# Patient Record
Sex: FEMALE | Race: ASIAN | NOT HISPANIC OR LATINO
[De-identification: names, ages, dates, MRNs, and addresses within clinical notes are randomized per-mention and may not be internally consistent; named-entity substitution may affect disease eponyms.]

---

## 2018-07-13 ENCOUNTER — TRANSCRIPTION ENCOUNTER (OUTPATIENT)
Age: 43
End: 2018-07-13

## 2018-10-24 ENCOUNTER — TRANSCRIPTION ENCOUNTER (OUTPATIENT)
Age: 43
End: 2018-10-24

## 2021-08-09 ENCOUNTER — TRANSCRIPTION ENCOUNTER (OUTPATIENT)
Age: 46
End: 2021-08-09

## 2021-11-19 ENCOUNTER — TRANSCRIPTION ENCOUNTER (OUTPATIENT)
Age: 46
End: 2021-11-19

## 2021-12-01 ENCOUNTER — TRANSCRIPTION ENCOUNTER (OUTPATIENT)
Age: 46
End: 2021-12-01

## 2022-01-14 ENCOUNTER — TRANSCRIPTION ENCOUNTER (OUTPATIENT)
Age: 47
End: 2022-01-14

## 2022-03-16 PROBLEM — Z00.00 ENCOUNTER FOR PREVENTIVE HEALTH EXAMINATION: Status: ACTIVE | Noted: 2022-03-16

## 2022-07-07 ENCOUNTER — APPOINTMENT (OUTPATIENT)
Dept: ORTHOPEDIC SURGERY | Facility: CLINIC | Age: 47
End: 2022-07-07

## 2022-07-07 DIAGNOSIS — F17.200 NICOTINE DEPENDENCE, UNSPECIFIED, UNCOMPLICATED: ICD-10-CM

## 2022-07-07 DIAGNOSIS — M25.571 PAIN IN RIGHT ANKLE AND JOINTS OF RIGHT FOOT: ICD-10-CM

## 2022-07-07 DIAGNOSIS — Z63.5 DISRUPTION OF FAMILY BY SEPARATION AND DIVORCE: ICD-10-CM

## 2022-07-07 DIAGNOSIS — Z78.9 OTHER SPECIFIED HEALTH STATUS: ICD-10-CM

## 2022-07-07 DIAGNOSIS — S93.491A SPRAIN OF OTHER LIGAMENT OF RIGHT ANKLE, INITIAL ENCOUNTER: ICD-10-CM

## 2022-07-07 PROCEDURE — 73610 X-RAY EXAM OF ANKLE: CPT | Mod: RT

## 2022-07-07 PROCEDURE — E0114: CPT

## 2022-07-07 PROCEDURE — L1902: CPT | Mod: RT

## 2022-07-07 PROCEDURE — 99203 OFFICE O/P NEW LOW 30 MIN: CPT

## 2022-07-07 RX ORDER — IBUPROFEN 600 MG/1
600 TABLET ORAL
Refills: 0 | Status: ACTIVE | COMMUNITY
Start: 2022-07-07

## 2022-07-07 SDOH — SOCIAL STABILITY - SOCIAL INSECURITY: DISRUPTION OF FAMILY BY SEPARATION AND DIVORCE: Z63.5

## 2022-07-07 NOTE — HISTORY OF PRESENT ILLNESS
[de-identified] : 47 year old female presents with 5-6 week history of right ankle pain. She slipped on her stair case and twisted her ankle on the 1st of June 2022. She continues to have intermittent swelling, with radicular pain now into the foot and lower leg.

## 2022-07-07 NOTE — PHYSICAL EXAM
[Normal Coordination] : normal coordination [Normal DTR UE/LE] : normal DTR UE/LE  [Normal Sensation] : normal sensation [Normal Mood and Affect] : normal mood and affect [Orientated] : orientated [Normal Skin] : normal skin [No Rash] : no rash [No Ulcers] : no ulcers [No Lesions] : no lesions [No obvious lymphadenopathy in areas examined] : no obvious lymphadenopathy in areas examined [Right] : right foot and ankle [5___] : plantar flexion 5[unfilled]/5 [] : varicosities are not present [de-identified] : plantar flexion 20 degrees [[id number]] : dorsiflexion -5 degrees

## 2022-07-07 NOTE — ASSESSMENT
[FreeTextEntry1] : Discussed with patient that the average ankle sprain typically heals in 4-6 weeks however due to persistent symptoms as well as stiffness/ decreased range of motion noted in the ankle that we will be sending her to physical therapy.  She was placed in an ASO brace for support instability.  She will follow up for repeat evaluation and 8-10 weeks.  At that time she continues to have significant disability with no improvement of physical therapy might consider a MRI of the ankle.  She expressed understanding and had no additional questions or concerns

## 2022-09-02 ENCOUNTER — APPOINTMENT (OUTPATIENT)
Dept: ORTHOPEDIC SURGERY | Facility: CLINIC | Age: 47
End: 2022-09-02

## 2022-12-14 ENCOUNTER — APPOINTMENT (OUTPATIENT)
Dept: GASTROENTEROLOGY | Facility: CLINIC | Age: 47
End: 2022-12-14

## 2023-04-05 ENCOUNTER — APPOINTMENT (OUTPATIENT)
Dept: GASTROENTEROLOGY | Facility: CLINIC | Age: 48
End: 2023-04-05
Payer: MEDICAID

## 2023-04-05 VITALS
OXYGEN SATURATION: 98 % | HEIGHT: 62 IN | SYSTOLIC BLOOD PRESSURE: 98 MMHG | DIASTOLIC BLOOD PRESSURE: 81 MMHG | BODY MASS INDEX: 20.06 KG/M2 | HEART RATE: 92 BPM | WEIGHT: 109 LBS

## 2023-04-05 DIAGNOSIS — Z78.9 OTHER SPECIFIED HEALTH STATUS: ICD-10-CM

## 2023-04-05 DIAGNOSIS — R10.9 UNSPECIFIED ABDOMINAL PAIN: ICD-10-CM

## 2023-04-05 DIAGNOSIS — F17.200 NICOTINE DEPENDENCE, UNSPECIFIED, UNCOMPLICATED: ICD-10-CM

## 2023-04-05 PROCEDURE — 99204 OFFICE O/P NEW MOD 45 MIN: CPT

## 2023-04-05 RX ORDER — ATORVASTATIN CALCIUM 10 MG/1
10 TABLET, FILM COATED ORAL
Refills: 0 | Status: ACTIVE | COMMUNITY

## 2023-04-05 NOTE — HISTORY OF PRESENT ILLNESS
[FreeTextEntry1] : Patient is a 46 y/o female with PMHx of HLD whom presents for CRC. Patient notes more so constipated at times. Never had CRC. Patient also notes quite frequent GERD.

## 2023-04-05 NOTE — PHYSICAL EXAM

## 2023-04-05 NOTE — ASSESSMENT
[FreeTextEntry1] : Patient is a 48 y/o female with PMHx of HLD whom presents for CRC. Patient notes more so constipated at times. Never had CRC. Patient also notes quite frequent GERD. \par \par GERD\par - Dietary modification , low caffeine, low acidic foods\par - Avoid Meals 3-4 hours before bed\par - Head of Bed over 30 degrees\par - Pantoprazole 40mg daily\par - Setup for EGD \par \par Constipation\par - Risk and benefit explained in detail to the patient\par - Clear liquid diet the day before the procedure\par - Finish all of the prescribed prep as ordered to insure good visualization\par - Please refrain from any NSAID use including ASA one week before\par - Follow up 3-4 weeks after the procedure stressed to insure follow up on pathology and planning for future screenings

## 2023-05-19 ENCOUNTER — OUTPATIENT (OUTPATIENT)
Dept: INPATIENT UNIT | Facility: HOSPITAL | Age: 48
LOS: 1 days | Discharge: HOME | End: 2023-05-19
Payer: MEDICAID

## 2023-05-19 ENCOUNTER — TRANSCRIPTION ENCOUNTER (OUTPATIENT)
Age: 48
End: 2023-05-19

## 2023-05-19 ENCOUNTER — RESULT REVIEW (OUTPATIENT)
Age: 48
End: 2023-05-19

## 2023-05-19 VITALS
OXYGEN SATURATION: 99 % | SYSTOLIC BLOOD PRESSURE: 100 MMHG | DIASTOLIC BLOOD PRESSURE: 55 MMHG | HEART RATE: 64 BPM | RESPIRATION RATE: 20 BRPM

## 2023-05-19 VITALS
HEIGHT: 62 IN | DIASTOLIC BLOOD PRESSURE: 54 MMHG | TEMPERATURE: 99 F | SYSTOLIC BLOOD PRESSURE: 99 MMHG | RESPIRATION RATE: 20 BRPM | WEIGHT: 104.94 LBS | HEART RATE: 71 BPM

## 2023-05-19 DIAGNOSIS — K21.9 GASTRO-ESOPHAGEAL REFLUX DISEASE WITHOUT ESOPHAGITIS: ICD-10-CM

## 2023-05-19 DIAGNOSIS — R10.9 UNSPECIFIED ABDOMINAL PAIN: ICD-10-CM

## 2023-05-19 PROCEDURE — 45380 COLONOSCOPY AND BIOPSY: CPT

## 2023-05-19 PROCEDURE — 88312 SPECIAL STAINS GROUP 1: CPT

## 2023-05-19 PROCEDURE — 88305 TISSUE EXAM BY PATHOLOGIST: CPT

## 2023-05-19 PROCEDURE — 43239 EGD BIOPSY SINGLE/MULTIPLE: CPT | Mod: XS

## 2023-05-19 PROCEDURE — 88312 SPECIAL STAINS GROUP 1: CPT | Mod: 26

## 2023-05-19 PROCEDURE — 88305 TISSUE EXAM BY PATHOLOGIST: CPT | Mod: 26

## 2023-05-19 PROCEDURE — 81025 URINE PREGNANCY TEST: CPT

## 2023-05-19 RX ORDER — ATORVASTATIN CALCIUM 80 MG/1
1 TABLET, FILM COATED ORAL
Refills: 0 | DISCHARGE

## 2023-05-19 NOTE — ASU DISCHARGE PLAN (ADULT/PEDIATRIC) - NS MD DC FALL RISK RISK
For information on Fall & Injury Prevention, visit: [email or web address] OR  [email or web address] OR  https://www.cdc.gov/steadi/patient.html

## 2023-05-19 NOTE — CHART NOTE - NSCHARTNOTEFT_GEN_A_CORE
PACU ANESTHESIA ADMISSION NOTE      Procedure:   Post op diagnosis:      ____  Intubated  TV:______       Rate: ______      FiO2: ______    _x___  Patent Airway    _x___  Full return of protective reflexes    ____  Full recovery from anesthesia / back to baseline status    Vitals:P 70 R 22 BP 96/52 T 97 SpO2 98%  T(C): 37.2 (05-19-23 @ 07:15), Max: 37.2 (05-19-23 @ 07:15)  HR: 71 (05-19-23 @ 07:15) (71 - 71)  BP: 99/54 (05-19-23 @ 07:15) (99/54 - 99/54)  RR: 20 (05-19-23 @ 07:15) (20 - 20)  SpO2: --    Mental Status:  ____ Awake   _____ Alert   _____ Drowsy   _____ Sedated    Nausea/Vomiting:  _x___  NO       ______Yes,   See Post - Op Orders         Pain Scale (0-10):  __0___    Treatment: _x___ None    ____ See Post - Op/PCA Orders    Post - Operative Fluids:   __x__ Oral   ____ See Post - Op Orders  -------------as per surgeon    Plan: Discharge:   _x___Home       _____Floor     _____Critical Care    _____  Other:_________________    Comments:  No anesthesia issues or complications noted.  Discharge when criteria met.

## 2023-05-22 LAB — SURGICAL PATHOLOGY STUDY: SIGNIFICANT CHANGE UP

## 2023-05-24 DIAGNOSIS — K29.80 DUODENITIS WITHOUT BLEEDING: ICD-10-CM

## 2023-05-24 DIAGNOSIS — Z12.11 ENCOUNTER FOR SCREENING FOR MALIGNANT NEOPLASM OF COLON: ICD-10-CM

## 2023-05-24 DIAGNOSIS — K21.9 GASTRO-ESOPHAGEAL REFLUX DISEASE WITHOUT ESOPHAGITIS: ICD-10-CM

## 2023-05-24 DIAGNOSIS — K64.4 RESIDUAL HEMORRHOIDAL SKIN TAGS: ICD-10-CM

## 2023-05-24 DIAGNOSIS — D12.5 BENIGN NEOPLASM OF SIGMOID COLON: ICD-10-CM

## 2023-05-24 DIAGNOSIS — K57.30 DIVERTICULOSIS OF LARGE INTESTINE WITHOUT PERFORATION OR ABSCESS WITHOUT BLEEDING: ICD-10-CM

## 2023-05-24 DIAGNOSIS — K29.50 UNSPECIFIED CHRONIC GASTRITIS WITHOUT BLEEDING: ICD-10-CM

## 2023-05-24 LAB — SURGICAL PATHOLOGY STUDY: SIGNIFICANT CHANGE UP

## 2023-06-06 RX ORDER — POLYETHYLENE GLYCOL 3350 AND ELECTROLYTES WITH LEMON FLAVOR 236; 22.74; 6.74; 5.86; 2.97 G/4L; G/4L; G/4L; G/4L; G/4L
236 POWDER, FOR SOLUTION ORAL
Qty: 1 | Refills: 0 | Status: DISCONTINUED | COMMUNITY
Start: 2023-04-05 | End: 2023-06-06

## 2023-06-07 ENCOUNTER — APPOINTMENT (OUTPATIENT)
Dept: GASTROENTEROLOGY | Facility: CLINIC | Age: 48
End: 2023-06-07
Payer: MEDICAID

## 2023-06-07 ENCOUNTER — TRANSCRIPTION ENCOUNTER (OUTPATIENT)
Age: 48
End: 2023-06-07

## 2023-06-07 DIAGNOSIS — D12.6 BENIGN NEOPLASM OF COLON, UNSPECIFIED: ICD-10-CM

## 2023-06-07 DIAGNOSIS — R12 HEARTBURN: ICD-10-CM

## 2023-06-07 DIAGNOSIS — Z78.9 OTHER SPECIFIED HEALTH STATUS: ICD-10-CM

## 2023-06-07 DIAGNOSIS — F17.200 NICOTINE DEPENDENCE, UNSPECIFIED, UNCOMPLICATED: ICD-10-CM

## 2023-06-07 PROCEDURE — 99213 OFFICE O/P EST LOW 20 MIN: CPT | Mod: 95

## 2023-06-07 RX ORDER — POLYETHYLENE GLYCOL 3350 17 G/17G
17 POWDER, FOR SOLUTION ORAL DAILY
Qty: 510 | Refills: 5 | Status: ACTIVE | COMMUNITY
Start: 2023-06-07 | End: 1900-01-01

## 2023-06-07 RX ORDER — PANTOPRAZOLE 40 MG/1
40 TABLET, DELAYED RELEASE ORAL DAILY
Qty: 1 | Refills: 5 | Status: ACTIVE | COMMUNITY
Start: 2023-06-07 | End: 1900-01-01

## 2023-06-09 PROBLEM — E78.00 PURE HYPERCHOLESTEROLEMIA, UNSPECIFIED: Chronic | Status: ACTIVE | Noted: 2023-05-19

## 2023-06-28 NOTE — ASSESSMENT
[FreeTextEntry1] : Patient is a 48 y/o female with PMHx of HLD whom presents for F/U post EGD and Colonoscopy.   Patient also notes occasional GERD depending on what she eats (onion, peppers, etc) for which she takes Tums with relief of symptoms. She denied further constipation or other GI complaints. \par \par GERD\par - Dietary modification , low caffeine, low acidic foods\par - Avoid Meals 3-4 hours before bed\par - Head of Bed over 30 degrees\par - Pantoprazole 40 mg daily\par - EGD showed a small hiatal hernia with tissue eosinophilia suggestive of reflux, negative for H Pylori, IM, or dysplasia\par - F/U in 6 months\par \par \par Constipation\par - Colonoscopy results discussed with her today; 2 small polyps were removed, one was a TA without dysplasia and the other one did not survive processing\par - Miralax OD prn constipation\par - Repeat colonoscopy in 5 years due to polyps\par

## 2023-06-28 NOTE — HISTORY OF PRESENT ILLNESS
[Home] : at home, [unfilled] , at the time of the visit. [Medical Office: ([facility])___] : at the medical office located in  [Verbal consent obtained from patient] : the patient, [unfilled] [FreeTextEntry4] : Zaynab [de-identified] : 5/19/23 [FreeTextEntry1] : 5/19/23

## 2023-12-13 ENCOUNTER — APPOINTMENT (OUTPATIENT)
Dept: GASTROENTEROLOGY | Facility: CLINIC | Age: 48
End: 2023-12-13
Payer: MEDICAID

## 2023-12-13 DIAGNOSIS — K21.9 GASTRO-ESOPHAGEAL REFLUX DISEASE W/OUT ESOPHAGITIS: ICD-10-CM

## 2023-12-13 DIAGNOSIS — K59.00 CONSTIPATION, UNSPECIFIED: ICD-10-CM

## 2023-12-13 DIAGNOSIS — Z78.9 OTHER SPECIFIED HEALTH STATUS: ICD-10-CM

## 2023-12-13 DIAGNOSIS — F17.200 NICOTINE DEPENDENCE, UNSPECIFIED, UNCOMPLICATED: ICD-10-CM

## 2023-12-13 DIAGNOSIS — R10.33 PERIUMBILICAL PAIN: ICD-10-CM

## 2023-12-13 PROCEDURE — 99213 OFFICE O/P EST LOW 20 MIN: CPT | Mod: 95

## 2023-12-20 NOTE — ASSESSMENT
[FreeTextEntry1] : Patient is a 48 y/o female with PMHx of HLD whom presents for 6 month F/U. Patient also notes occasional GERD depending on what she eats (onion, peppers, etc) for which she takes Tums with relief of symptoms; she has not been taking the PPI. She has noted increased constipation and bloating recently and she has not been taking the miralax q day, She also endorsed having periumbilical pain after eating described as dull most of the time although yesterday it was sharp and it has been occurring about once a week. She has not noted if it occurs with any particular foods ie fatty foods. The pain does not go away with having a BM.  She denied vomiting, fevers, dysphagia, weight loss, melena, or blood in the stool.   Postprandial abdominal pain - Will order an US of Abdomen to R/O gallstones - If US unrevealing, consider GE study - Will call patient when US results are available  GERD - Patient told to take PPI for better control of GERD  Constipation - She was told to take Miralax OD to ensure BMs q day

## 2023-12-20 NOTE — REVIEW OF SYSTEMS
[As Noted in HPI] : as noted in HPI [Abdominal Pain] : abdominal pain [Constipation] : constipation [Negative] : Heme/Lymph [Bloating (gassiness)] : bloating [Vomiting] : no vomiting [Diarrhea] : no diarrhea [Heartburn] : no heartburn [Melena (black stool)] : no melena [Bleeding] : no bleeding [Fecal Incontinence (soiling)] : no fecal incontinence

## 2023-12-20 NOTE — HISTORY OF PRESENT ILLNESS
[FreeTextEntry1] : Patient is a 46 y/o female with PMHx of HLD whom presents for 6 month F/U. Patient also notes occasional GERD depending on what she eats (onion, peppers, etc) for which she takes Tums with relief of symptoms; she has not been taking the PPI. She has noted increased constipation and bloating recently and she has not been taking the miralax q day, She also endorsed having periumbilical pain after eating described as dull most of the time although yesterday it was sharp and it has been occurring about once a week.